# Patient Record
Sex: FEMALE | Race: WHITE | NOT HISPANIC OR LATINO | Employment: FULL TIME | URBAN - METROPOLITAN AREA
[De-identification: names, ages, dates, MRNs, and addresses within clinical notes are randomized per-mention and may not be internally consistent; named-entity substitution may affect disease eponyms.]

---

## 2017-01-06 ENCOUNTER — COMMUNICATION - HEALTHEAST (OUTPATIENT)
Dept: FAMILY MEDICINE | Facility: CLINIC | Age: 32
End: 2017-01-06

## 2017-02-21 ENCOUNTER — OFFICE VISIT - HEALTHEAST (OUTPATIENT)
Dept: FAMILY MEDICINE | Facility: CLINIC | Age: 32
End: 2017-02-21

## 2017-02-21 DIAGNOSIS — R73.01 IMPAIRED FASTING GLUCOSE: ICD-10-CM

## 2017-02-21 DIAGNOSIS — Z00.00 ROUTINE GENERAL MEDICAL EXAMINATION AT A HEALTH CARE FACILITY: ICD-10-CM

## 2017-02-21 DIAGNOSIS — Z71.84 COUNSELING ABOUT TRAVEL: ICD-10-CM

## 2017-02-21 LAB
CHOLEST SERPL-MCNC: 122 MG/DL
FASTING STATUS PATIENT QL REPORTED: YES
HDLC SERPL-MCNC: 61 MG/DL
LDLC SERPL CALC-MCNC: 51 MG/DL
TRIGL SERPL-MCNC: 48 MG/DL

## 2017-02-21 ASSESSMENT — MIFFLIN-ST. JEOR: SCORE: 1229.29

## 2017-03-07 ENCOUNTER — COMMUNICATION - HEALTHEAST (OUTPATIENT)
Dept: FAMILY MEDICINE | Facility: CLINIC | Age: 32
End: 2017-03-07

## 2017-03-08 ENCOUNTER — AMBULATORY - HEALTHEAST (OUTPATIENT)
Dept: FAMILY MEDICINE | Facility: CLINIC | Age: 32
End: 2017-03-08

## 2017-03-09 ENCOUNTER — AMBULATORY - HEALTHEAST (OUTPATIENT)
Dept: FAMILY MEDICINE | Facility: CLINIC | Age: 32
End: 2017-03-09

## 2017-03-09 DIAGNOSIS — F41.9 ANXIETY: ICD-10-CM

## 2017-09-05 ENCOUNTER — RECORDS - HEALTHEAST (OUTPATIENT)
Dept: ADMINISTRATIVE | Facility: OTHER | Age: 32
End: 2017-09-05

## 2017-11-17 ENCOUNTER — RECORDS - HEALTHEAST (OUTPATIENT)
Dept: ADMINISTRATIVE | Facility: OTHER | Age: 32
End: 2017-11-17

## 2018-02-01 ENCOUNTER — OFFICE VISIT - HEALTHEAST (OUTPATIENT)
Dept: FAMILY MEDICINE | Facility: CLINIC | Age: 33
End: 2018-02-01

## 2018-02-01 DIAGNOSIS — M25.552 LEFT HIP PAIN: ICD-10-CM

## 2018-02-01 DIAGNOSIS — Z00.00 ROUTINE GENERAL MEDICAL EXAMINATION AT A HEALTH CARE FACILITY: ICD-10-CM

## 2018-02-01 ASSESSMENT — MIFFLIN-ST. JEOR: SCORE: 1236.54

## 2018-06-13 ENCOUNTER — RECORDS - HEALTHEAST (OUTPATIENT)
Dept: ADMINISTRATIVE | Facility: OTHER | Age: 33
End: 2018-06-13

## 2018-11-26 ENCOUNTER — COMMUNICATION - HEALTHEAST (OUTPATIENT)
Dept: FAMILY MEDICINE | Facility: CLINIC | Age: 33
End: 2018-11-26

## 2018-12-07 ENCOUNTER — OFFICE VISIT - HEALTHEAST (OUTPATIENT)
Dept: FAMILY MEDICINE | Facility: CLINIC | Age: 33
End: 2018-12-07

## 2018-12-07 DIAGNOSIS — R82.90 BAD ODOR OF URINE: ICD-10-CM

## 2018-12-07 DIAGNOSIS — N89.8 VAGINAL DISCHARGE: ICD-10-CM

## 2018-12-07 DIAGNOSIS — R31.29 MICROSCOPIC HEMATURIA: ICD-10-CM

## 2018-12-07 LAB
ALBUMIN UR-MCNC: NEGATIVE MG/DL
APPEARANCE UR: CLEAR
BACTERIA #/AREA URNS HPF: ABNORMAL HPF
BILIRUB UR QL STRIP: NEGATIVE
CLUE CELLS: NORMAL
COLOR UR AUTO: YELLOW
GLUCOSE UR STRIP-MCNC: NEGATIVE MG/DL
HGB UR QL STRIP: ABNORMAL
KETONES UR STRIP-MCNC: NEGATIVE MG/DL
LEUKOCYTE ESTERASE UR QL STRIP: NEGATIVE
NITRATE UR QL: NEGATIVE
PH UR STRIP: 6.5 [PH] (ref 5–8)
RBC #/AREA URNS AUTO: ABNORMAL HPF
SP GR UR STRIP: <=1.005 (ref 1–1.03)
SQUAMOUS #/AREA URNS AUTO: ABNORMAL LPF
TRICHOMONAS, WET PREP: NORMAL
UROBILINOGEN UR STRIP-ACNC: ABNORMAL
WBC #/AREA URNS AUTO: ABNORMAL HPF
YEAST, WET PREP: NORMAL

## 2018-12-07 ASSESSMENT — MIFFLIN-ST. JEOR: SCORE: 1228.38

## 2018-12-08 LAB — BACTERIA SPEC CULT: NO GROWTH

## 2018-12-12 ENCOUNTER — COMMUNICATION - HEALTHEAST (OUTPATIENT)
Dept: FAMILY MEDICINE | Facility: CLINIC | Age: 33
End: 2018-12-12

## 2018-12-12 ENCOUNTER — AMBULATORY - HEALTHEAST (OUTPATIENT)
Dept: FAMILY MEDICINE | Facility: CLINIC | Age: 33
End: 2018-12-12

## 2018-12-12 DIAGNOSIS — R31.29 MICROSCOPIC HEMATURIA: ICD-10-CM

## 2018-12-21 ENCOUNTER — COMMUNICATION - HEALTHEAST (OUTPATIENT)
Dept: FAMILY MEDICINE | Facility: CLINIC | Age: 33
End: 2018-12-21

## 2018-12-21 ENCOUNTER — AMBULATORY - HEALTHEAST (OUTPATIENT)
Dept: LAB | Facility: CLINIC | Age: 33
End: 2018-12-21

## 2018-12-21 DIAGNOSIS — R31.29 MICROSCOPIC HEMATURIA: ICD-10-CM

## 2018-12-21 LAB
ALBUMIN UR-MCNC: NEGATIVE MG/DL
APPEARANCE UR: CLEAR
BACTERIA #/AREA URNS HPF: ABNORMAL HPF
BILIRUB UR QL STRIP: NEGATIVE
COLOR UR AUTO: YELLOW
GLUCOSE UR STRIP-MCNC: NEGATIVE MG/DL
HGB UR QL STRIP: ABNORMAL
KETONES UR STRIP-MCNC: NEGATIVE MG/DL
LEUKOCYTE ESTERASE UR QL STRIP: NEGATIVE
NITRATE UR QL: NEGATIVE
PH UR STRIP: 5.5 [PH] (ref 5–8)
RBC #/AREA URNS AUTO: ABNORMAL HPF
SP GR UR STRIP: <=1.005 (ref 1–1.03)
SQUAMOUS #/AREA URNS AUTO: ABNORMAL LPF
UROBILINOGEN UR STRIP-ACNC: ABNORMAL
WBC #/AREA URNS AUTO: ABNORMAL HPF

## 2018-12-22 ENCOUNTER — OFFICE VISIT (OUTPATIENT)
Dept: URGENT CARE | Facility: URGENT CARE | Age: 33
End: 2018-12-22
Payer: COMMERCIAL

## 2018-12-22 VITALS
HEIGHT: 64 IN | OXYGEN SATURATION: 98 % | DIASTOLIC BLOOD PRESSURE: 66 MMHG | WEIGHT: 119 LBS | BODY MASS INDEX: 20.32 KG/M2 | HEART RATE: 72 BPM | SYSTOLIC BLOOD PRESSURE: 94 MMHG | TEMPERATURE: 99.1 F

## 2018-12-22 DIAGNOSIS — M54.42 CHRONIC LEFT-SIDED LOW BACK PAIN WITH LEFT-SIDED SCIATICA: Primary | ICD-10-CM

## 2018-12-22 DIAGNOSIS — G89.29 CHRONIC LEFT-SIDED LOW BACK PAIN WITH LEFT-SIDED SCIATICA: Primary | ICD-10-CM

## 2018-12-22 PROCEDURE — 99203 OFFICE O/P NEW LOW 30 MIN: CPT | Performed by: FAMILY MEDICINE

## 2018-12-22 RX ORDER — IBUPROFEN 400 MG/1
400 TABLET, FILM COATED ORAL EVERY 6 HOURS PRN
COMMUNITY

## 2018-12-22 RX ORDER — SPIRONOLACTONE 100 MG/1
100 TABLET, FILM COATED ORAL DAILY
COMMUNITY

## 2018-12-22 ASSESSMENT — MIFFLIN-ST. JEOR: SCORE: 1224.78

## 2018-12-22 NOTE — PROGRESS NOTES
Subjective: Patient has complained about left-sided pain in the hip and low back radiating into the abdomen and down sometimes the left leg for a few months anyway, initially got some help with a chiropractor but now it is not helping.  She sits all day at work.  Exercise does not seem to make things worse.  Bowel and bladder are working okay.  She has about 2 menses a month and so recently has had a couple of urinary evaluations done including one yesterday which only show a little blood consistent with menstrual blood.  Apparently there is a family history of some kidney problems.    Objective: Abdominal exam is normal.  Hip exam is normal.  Low back exam is normal.    Assessment and plan: Left low back symptoms with some nerve irritation I believe.  I suggested may be a physical therapy trial approach might be worth doing at this point and if that fails, go back to her regular doctor to sort out how to approach this problem.

## 2019-03-05 ENCOUNTER — OFFICE VISIT - HEALTHEAST (OUTPATIENT)
Dept: FAMILY MEDICINE | Facility: CLINIC | Age: 34
End: 2019-03-05

## 2019-03-05 DIAGNOSIS — Z13.220 LIPID SCREENING: ICD-10-CM

## 2019-03-05 DIAGNOSIS — Z00.00 ROUTINE GENERAL MEDICAL EXAMINATION AT A HEALTH CARE FACILITY: ICD-10-CM

## 2019-03-05 DIAGNOSIS — M25.552 HIP PAIN, LEFT: ICD-10-CM

## 2019-03-05 DIAGNOSIS — Z13.1 DIABETES MELLITUS SCREENING: ICD-10-CM

## 2019-03-05 DIAGNOSIS — L70.0 ACNE VULGARIS: ICD-10-CM

## 2019-03-05 LAB
CHOLEST SERPL-MCNC: 152 MG/DL
FASTING STATUS PATIENT QL REPORTED: YES
FASTING STATUS PATIENT QL REPORTED: YES
GLUCOSE BLD-MCNC: 88 MG/DL (ref 70–125)
HDLC SERPL-MCNC: 58 MG/DL
HGB BLD-MCNC: 13.6 G/DL (ref 12–16)
LDLC SERPL CALC-MCNC: 78 MG/DL
TRIGL SERPL-MCNC: 82 MG/DL

## 2019-03-05 ASSESSMENT — MIFFLIN-ST. JEOR: SCORE: 1215.79

## 2019-03-13 ENCOUNTER — RECORDS - HEALTHEAST (OUTPATIENT)
Dept: ADMINISTRATIVE | Facility: OTHER | Age: 34
End: 2019-03-13

## 2019-05-24 ENCOUNTER — OFFICE VISIT - HEALTHEAST (OUTPATIENT)
Dept: FAMILY MEDICINE | Facility: CLINIC | Age: 34
End: 2019-05-24

## 2019-05-24 DIAGNOSIS — L70.0 ACNE VULGARIS: ICD-10-CM

## 2019-05-24 DIAGNOSIS — M25.552 HIP PAIN, LEFT: ICD-10-CM

## 2019-05-24 DIAGNOSIS — Z01.818 ENCOUNTER FOR PREOPERATIVE EXAMINATION FOR GENERAL SURGICAL PROCEDURE: ICD-10-CM

## 2019-05-24 LAB
HCG UR QL: NEGATIVE
HGB BLD-MCNC: 13.2 G/DL (ref 12–16)

## 2019-05-24 ASSESSMENT — MIFFLIN-ST. JEOR: SCORE: 1214.55

## 2019-06-18 ENCOUNTER — RECORDS - HEALTHEAST (OUTPATIENT)
Dept: ADMINISTRATIVE | Facility: OTHER | Age: 34
End: 2019-06-18

## 2019-07-03 ENCOUNTER — RECORDS - HEALTHEAST (OUTPATIENT)
Dept: ADMINISTRATIVE | Facility: OTHER | Age: 34
End: 2019-07-03

## 2019-08-05 ENCOUNTER — RECORDS - HEALTHEAST (OUTPATIENT)
Dept: ADMINISTRATIVE | Facility: OTHER | Age: 34
End: 2019-08-05

## 2020-03-06 ENCOUNTER — OFFICE VISIT - HEALTHEAST (OUTPATIENT)
Dept: FAMILY MEDICINE | Facility: CLINIC | Age: 35
End: 2020-03-06

## 2020-03-06 DIAGNOSIS — H92.02 OTALGIA, LEFT: ICD-10-CM

## 2020-03-06 DIAGNOSIS — Z13.220 LIPID SCREENING: ICD-10-CM

## 2020-03-06 DIAGNOSIS — N89.8 VAGINAL DISCHARGE: ICD-10-CM

## 2020-03-06 DIAGNOSIS — L84 CALLUS OF FOOT: ICD-10-CM

## 2020-03-06 DIAGNOSIS — Z79.899 MEDICATION MANAGEMENT: ICD-10-CM

## 2020-03-06 DIAGNOSIS — L70.0 ACNE VULGARIS: ICD-10-CM

## 2020-03-06 DIAGNOSIS — M79.661 RIGHT CALF PAIN: ICD-10-CM

## 2020-03-06 DIAGNOSIS — Z00.00 ROUTINE GENERAL MEDICAL EXAMINATION AT A HEALTH CARE FACILITY: ICD-10-CM

## 2020-03-06 LAB
ANION GAP SERPL CALCULATED.3IONS-SCNC: 9 MMOL/L (ref 5–18)
BUN SERPL-MCNC: 11 MG/DL (ref 8–22)
CALCIUM SERPL-MCNC: 9.4 MG/DL (ref 8.5–10.5)
CHLORIDE BLD-SCNC: 102 MMOL/L (ref 98–107)
CHOLEST SERPL-MCNC: 155 MG/DL
CLUE CELLS: NORMAL
CO2 SERPL-SCNC: 27 MMOL/L (ref 22–31)
CREAT SERPL-MCNC: 0.79 MG/DL (ref 0.6–1.1)
D DIMER PPP FEU-MCNC: 0.3 FEU UG/ML
FASTING STATUS PATIENT QL REPORTED: YES
GFR SERPL CREATININE-BSD FRML MDRD: >60 ML/MIN/1.73M2
GLUCOSE BLD-MCNC: 81 MG/DL (ref 70–125)
HDLC SERPL-MCNC: 62 MG/DL
HGB BLD-MCNC: 13.7 G/DL (ref 12–16)
LDLC SERPL CALC-MCNC: 82 MG/DL
MAGNESIUM SERPL-MCNC: 1.6 MG/DL (ref 1.8–2.6)
POTASSIUM BLD-SCNC: 3.7 MMOL/L (ref 3.5–5)
SODIUM SERPL-SCNC: 138 MMOL/L (ref 136–145)
TRICHOMONAS, WET PREP: NORMAL
TRIGL SERPL-MCNC: 57 MG/DL
YEAST, WET PREP: NORMAL

## 2020-03-06 ASSESSMENT — MIFFLIN-ST. JEOR: SCORE: 1222.94

## 2020-03-08 ENCOUNTER — AMBULATORY - HEALTHEAST (OUTPATIENT)
Dept: FAMILY MEDICINE | Facility: CLINIC | Age: 35
End: 2020-03-08

## 2020-03-08 DIAGNOSIS — E83.42 HYPOMAGNESEMIA: ICD-10-CM

## 2020-03-09 LAB
HPV SOURCE: NORMAL
HUMAN PAPILLOMA VIRUS 16 DNA: NEGATIVE
HUMAN PAPILLOMA VIRUS 18 DNA: NEGATIVE
HUMAN PAPILLOMA VIRUS FINAL DIAGNOSIS: NORMAL
HUMAN PAPILLOMA VIRUS OTHER HR: NEGATIVE
SPECIMEN DESCRIPTION: NORMAL

## 2020-04-06 ENCOUNTER — COMMUNICATION - HEALTHEAST (OUTPATIENT)
Dept: FAMILY MEDICINE | Facility: CLINIC | Age: 35
End: 2020-04-06

## 2020-04-06 DIAGNOSIS — E83.42 HYPOMAGNESEMIA: ICD-10-CM

## 2020-04-10 ENCOUNTER — COMMUNICATION - HEALTHEAST (OUTPATIENT)
Dept: FAMILY MEDICINE | Facility: CLINIC | Age: 35
End: 2020-04-10

## 2020-04-10 ENCOUNTER — AMBULATORY - HEALTHEAST (OUTPATIENT)
Dept: LAB | Facility: CLINIC | Age: 35
End: 2020-04-10

## 2020-04-10 DIAGNOSIS — E83.42 HYPOMAGNESEMIA: ICD-10-CM

## 2020-04-10 LAB — MAGNESIUM SERPL-MCNC: 1.7 MG/DL (ref 1.8–2.6)

## 2020-05-19 ENCOUNTER — AMBULATORY - HEALTHEAST (OUTPATIENT)
Dept: LAB | Facility: CLINIC | Age: 35
End: 2020-05-19

## 2020-05-19 ENCOUNTER — COMMUNICATION - HEALTHEAST (OUTPATIENT)
Dept: LAB | Facility: CLINIC | Age: 35
End: 2020-05-19

## 2020-05-19 ENCOUNTER — AMBULATORY - HEALTHEAST (OUTPATIENT)
Dept: FAMILY MEDICINE | Facility: CLINIC | Age: 35
End: 2020-05-19

## 2020-05-19 DIAGNOSIS — E83.42 HYPOMAGNESEMIA: ICD-10-CM

## 2020-05-19 LAB — MAGNESIUM SERPL-MCNC: 1.8 MG/DL (ref 1.8–2.6)

## 2020-07-23 ENCOUNTER — COMMUNICATION - HEALTHEAST (OUTPATIENT)
Dept: FAMILY MEDICINE | Facility: CLINIC | Age: 35
End: 2020-07-23

## 2020-07-23 DIAGNOSIS — E83.42 HYPOMAGNESEMIA: ICD-10-CM

## 2021-05-07 ENCOUNTER — OFFICE VISIT - HEALTHEAST (OUTPATIENT)
Dept: FAMILY MEDICINE | Facility: CLINIC | Age: 36
End: 2021-05-07

## 2021-05-07 DIAGNOSIS — Z00.00 ROUTINE GENERAL MEDICAL EXAMINATION AT A HEALTH CARE FACILITY: ICD-10-CM

## 2021-05-07 DIAGNOSIS — Z13.220 LIPID SCREENING: ICD-10-CM

## 2021-05-07 DIAGNOSIS — B35.1 ONYCHOMYCOSIS: ICD-10-CM

## 2021-05-07 DIAGNOSIS — L65.9 HAIR LOSS: ICD-10-CM

## 2021-05-07 DIAGNOSIS — Z13.1 DIABETES MELLITUS SCREENING: ICD-10-CM

## 2021-05-07 DIAGNOSIS — L84 CALLUS OF FOOT: ICD-10-CM

## 2021-05-07 DIAGNOSIS — M77.01 MEDIAL EPICONDYLITIS OF ELBOW, RIGHT: ICD-10-CM

## 2021-05-07 LAB
CHOLEST SERPL-MCNC: 152 MG/DL
FASTING STATUS PATIENT QL REPORTED: YES
FASTING STATUS PATIENT QL REPORTED: YES
GLUCOSE BLD-MCNC: 80 MG/DL (ref 70–125)
HBA1C MFR BLD: 4.9 %
HDLC SERPL-MCNC: 62 MG/DL
HGB BLD-MCNC: 13.1 G/DL (ref 12–16)
LDLC SERPL CALC-MCNC: 75 MG/DL
T4 FREE SERPL-MCNC: 1 NG/DL (ref 0.7–1.8)
TRIGL SERPL-MCNC: 75 MG/DL
TSH SERPL DL<=0.005 MIU/L-ACNC: 1.46 UIU/ML (ref 0.3–5)

## 2021-05-07 ASSESSMENT — MIFFLIN-ST. JEOR: SCORE: 1213.87

## 2021-05-27 VITALS
HEART RATE: 60 BPM | SYSTOLIC BLOOD PRESSURE: 100 MMHG | HEIGHT: 65 IN | DIASTOLIC BLOOD PRESSURE: 62 MMHG | BODY MASS INDEX: 19.39 KG/M2 | WEIGHT: 116.4 LBS

## 2021-05-29 NOTE — PROGRESS NOTES
Preoperative Exam    Scheduled Procedure: left hip arthroscopy  Surgery Date:  6/18/19  Surgery Location: [facility], fax [phone]    Surgeon:  Dr. Mcfarland    Assessment/Plan:     1. Encounter for preoperative examination for general surgical procedure  Patient is approved for surgery.  She will hold NSAIDs and fish oil for 7 days prior to surgery.  - Hemoglobin  - Pregnancy, Urine    2. Hip pain, left  Discussed symptomatic treatment with over-the-counter Tylenol.    3. Acne vulgaris  Patient will hold Spironolactone the morning of surgery.  May resume postop per surgeon.        Surgical Procedure Risk: Intermediate (reported cardiac risk generally 1-5%)  Have you had prior anesthesia?: Yes  Have you or any family members had a previous anesthesia reaction:  No  Do you or any family members have a history of a clotting or bleeding disorder?: No  Cardiac Risk Assessment: no increased risk for major cardiac complications    Patient approved for surgery with general or local anesthesia.    Functional Status: Independent  Patient plans to recover at home with family.     Subjective:      Arianna Leung is a 34 y.o. female who presents for a preoperative consultation.  Patient has been experiencing left hip pain for 2 years.  She denies any known injury.  Pain is an intermittent ache which is exacerbated with working out and walking or standing for prolonged periods.  She feels as though nothing assist the pain.  She has tried ibuprofen and Tylenol.  MRI showed a labral tear.    Patient takes spironolactone daily for acne.    All other systems reviewed and are negative, other than those listed in the HPI.    Pertinent History  Do you have difficulty breathing or chest pain after walking up a flight of stairs: No  History of obstructive sleep apnea: No  Steroid use in the last 6 months: No  Frequent Aspirin/NSAID use: No  Prior Blood Transfusion: No  Prior Blood Transfusion  Reaction: No  If for some reason prior to, during or after the procedure, if it is medically indicated, would you be willing to have a blood transfusion?:  There is no transfusion refusal.    Current Outpatient Medications   Medication Sig Dispense Refill     MULTIVITAMIN (MULTIPLE VITAMIN ORAL) Take 1 tablet by mouth daily.       omega-3/dha/epa/fish oil (FISH OIL-OMEGA-3 FATTY ACIDS) 300-1,000 mg capsule        spironolactone (ALDACTONE) 50 MG tablet Take 50 mg by mouth 3 (three) times a day as needed.       No current facility-administered medications for this visit.         No Known Allergies    Patient Active Problem List   Diagnosis     Fibroadenoma Of The Breast     Impaired Fasting Glucose       No past medical history on file.    Past Surgical History:   Procedure Laterality Date     BREAST BIOPSY Left 2005       Social History     Socioeconomic History     Marital status: Single     Spouse name: Not on file     Number of children: Not on file     Years of education: Not on file     Highest education level: Not on file   Occupational History     Not on file   Social Needs     Financial resource strain: Not on file     Food insecurity:     Worry: Not on file     Inability: Not on file     Transportation needs:     Medical: Not on file     Non-medical: Not on file   Tobacco Use     Smoking status: Never Smoker     Smokeless tobacco: Never Used   Substance and Sexual Activity     Alcohol use: Yes     Comment: rare     Drug use: No     Sexual activity: Never     Comment: single    Lifestyle     Physical activity:     Days per week: Not on file     Minutes per session: Not on file     Stress: Not on file   Relationships     Social connections:     Talks on phone: Not on file     Gets together: Not on file     Attends [employer] service: Not on file     Active member of club or organization: Not on file     Attends meetings of clubs or organizations: Not on file     Relationship status: Not on file     Intimate  "partner violence:     Fear of current or ex partner: Not on file     Emotionally abused: Not on file     Physically abused: Not on file     Forced sexual activity: Not on file   Other Topics Concern     Not on file   Social History Narrative     Not on file       Patient Care Team:  Eleni Quijano CNP as PCP - General          Objective:     Vitals:    05/24/19 1429   BP: 102/60   Pulse: 66   Weight: 118 lb 4.8 oz (53.7 kg)   Height: 5' 4.5\" (1.638 m)         Physical Exam:  /60 (Patient Site: Left Arm, Patient Position: Sitting, Cuff Size: Adult Regular)   Pulse 66   Ht 5' 4.5\" (1.638 m)   Wt 118 lb 4.8 oz (53.7 kg)   BMI 19.99 kg/m      General Appearance:    Alert, cooperative, no distress, appears stated age   Head:    Normocephalic, without obvious abnormality, atraumatic   Eyes:    PERRL, conjunctiva/corneas clear, EOM's intact, fundi     benign, both eyes   Ears:    Normal TM's and external ear canals, both ears   Nose:   Nares normal, septum midline, mucosa normal, no drainage     or sinus tenderness   Throat:   Lips, mucosa, and tongue normal; teeth and gums normal   Neck:   Supple, symmetrical, trachea midline, no adenopathy;     thyroid:  no enlargement/tenderness/nodules; no carotid    bruit or JVD   Back:     Symmetric, no curvature, ROM normal, no CVA tenderness   Lungs:     Clear to auscultation bilaterally, respirations unlabored   Chest Wall:    No tenderness or deformity    Heart:    Regular rate and rhythm, S1 and S2 normal, no murmur, rub    or gallop   Breast Exam:    deferred   Abdomen:     Soft, non-tender, bowel sounds active all four quadrants,     no masses, no organomegaly   Genitalia:    deferred   Rectal:    deferred   Extremities:   Extremities normal, atraumatic, no cyanosis or edema   Pulses:   2+ and symmetric all extremities   Skin:   Skin color, texture, turgor normal, no rashes or lesions   Lymph nodes:   Cervical, supraclavicular, and axillary nodes normal "   Neurologic:   CNII-XII intact, normal strength, sensation and reflexes     throughout       Patient Instructions     Hold all supplements, aspirin and NSAIDs for 7 days prior to surgery.  Follow your surgeon's direction on when to stop eating and drinking prior to surgery.  Your surgeon will be managing your pain after your surgery.    Remove all jewelry and metal piercings before your surgery.   Remove nail polish from fingers before surgery.      Labs:  Recent Results (from the past 24 hour(s))   Hemoglobin    Collection Time: 05/24/19  2:34 PM   Result Value Ref Range    Hemoglobin 13.2 12.0 - 16.0 g/dL   Pregnancy, Urine    Collection Time: 05/24/19  3:04 PM   Result Value Ref Range    Pregnancy Test, Urine Negative Negative       Immunization History   Administered Date(s) Administered     Dtap 1984, 1984, 1984, 09/26/1985, 05/26/1989     Hep A, Adult IM (19yr & older) 01/25/2013, 02/21/2017     Hep A, historic 01/25/2013     Hep B, Adult 07/09/1999, 08/19/1999, 07/24/2000     Influenza, seasonal,quad inj 36+ mos 02/01/2018     MMR 09/26/1985, 06/25/1996     Td, adult adsorbed, PF 06/25/1996, 06/05/2006     Td,adult,historic,unspecified 06/05/2006     Tdap 01/22/2013     Typhoid Live, Oral 01/28/2013           Electronically signed by Eleni Quijano CNP 05/24/19 2:31 PM

## 2021-05-30 VITALS — BODY MASS INDEX: 21.05 KG/M2 | WEIGHT: 123.3 LBS | HEIGHT: 64 IN

## 2021-06-01 VITALS — WEIGHT: 124.9 LBS | BODY MASS INDEX: 21.32 KG/M2 | HEIGHT: 64 IN

## 2021-06-02 VITALS — HEIGHT: 65 IN | BODY MASS INDEX: 19.61 KG/M2 | WEIGHT: 117.7 LBS

## 2021-06-02 VITALS — WEIGHT: 123.1 LBS | HEIGHT: 64 IN | BODY MASS INDEX: 21.02 KG/M2

## 2021-06-03 VITALS — HEIGHT: 65 IN | BODY MASS INDEX: 19.71 KG/M2 | WEIGHT: 118.3 LBS

## 2021-06-04 VITALS
DIASTOLIC BLOOD PRESSURE: 60 MMHG | BODY MASS INDEX: 19.73 KG/M2 | OXYGEN SATURATION: 99 % | HEIGHT: 65 IN | WEIGHT: 118.4 LBS | HEART RATE: 58 BPM | SYSTOLIC BLOOD PRESSURE: 96 MMHG

## 2021-06-06 NOTE — PROGRESS NOTES
Assessment and Plan:    1. Routine general medical examination at a health care facility  Discussed consuming a healthy diet and exercising.  She declines HIV screening and influenza vaccine.  She is otherwise up-to-date.  - Gynecologic Cytology (PAP Smear)  - Hemoglobin    2. Lipid screening  - Lipid Cascade    3. Right calf pain  Differentials for calf pain include DVT, electrolyte imbalance, muscular strain.  Will obtain d-dimer to rule out DVT.  Will check BMP and magnesium.  If d-dimer is elevated, will obtain ultrasound.  Discussed symptomatic treatment.  - D-dimer, Quantitative  - Magnesium    4. Otalgia, left  Provided reassurance.  No infection is noted today.    5. Acne vulgaris  She continues to see dermatology.  She is taking spironolactone.    6. Callus of foot  Discussed symptomatic treatment with the pedicure or using a pumice.  If no improvement in symptoms, may consider referral to podiatry.  She is content with the plan.    7. Medication management  - Basic Metabolic Panel    8.  Vaginal discharge  Wet prep ordered.  Will notify patient of the results.  She is content with the plan.     Subjective:     Arianna is a 34 y.o. female presenting to the clinic for a female physical.       LMP: 2/14/20 regular once/month   Hx of abnormal pap smear: none  Last pap smear: 1/13/15 normal, negative HPV   Perform self-breast exams: none  Vaginal discharge or irritation: normal discharge  Sexually active: no, currently single  Contraception: none  Concerns for STDs: no  Previous pregnancies:no    Patient sees dermatology who prescribes spironolactone for acne.    Patient has multiple concerns today.  She is concerned of a possible plantars wart on the bottom of her right foot.  This developed this summer.  She occasionally experiences pain with ambulation.  She has not tried any over-the-counter products for her symptoms.  Secondly, patient is concerned of right calf pain.  A piece of plywood fell on her calf  in October.  This caused a scab develop and she had some tenderness within the area.  Patient states the calf still feels bruised and tender to touch.  She has not noticed any erythema or swelling.  She traveled in October.  She denies any personal family history of blood clots.  Lastly, patient occasionally has a sharp pain within her left ear.  This lasts for a few seconds.  She feels as though fluid is moving in her ear.  She has had some ear fullness.  She denies recent cold symptoms or fever.    She has noticed an increase in vaginal discharge over the past month.  She denies any vaginal itching or odor.  She has not tried any over-the-counter products for her symptoms.  She has no concerns for STDs.    Review of systems:  I performed a 10 point review of systems.  All pertinent positives and negatives are noted in the HPI. All others are negative.     No Known Allergies    Current Outpatient Medications on File Prior to Visit   Medication Sig Dispense Refill     cholecalciferol, vitamin D3, 1,000 unit (25 mcg) tablet Take 1,000 Units by mouth daily.       MULTIVITAMIN (MULTIPLE VITAMIN ORAL) Take 1 tablet by mouth daily.       omega-3/dha/epa/fish oil (FISH OIL-OMEGA-3 FATTY ACIDS) 300-1,000 mg capsule        spironolactone (ALDACTONE) 50 MG tablet Take 50 mg by mouth 3 (three) times a day as needed.       No current facility-administered medications on file prior to visit.        Social History     Socioeconomic History     Marital status: Single     Spouse name: Not on file     Number of children: Not on file     Years of education: Not on file     Highest education level: Not on file   Occupational History     Not on file   Social Needs     Financial resource strain: Not on file     Food insecurity:     Worry: Not on file     Inability: Not on file     Transportation needs:     Medical: Not on file     Non-medical: Not on file   Tobacco Use     Smoking status: Never Smoker     Smokeless tobacco: Never Used    Substance and Sexual Activity     Alcohol use: Yes     Comment: rare     Drug use: No     Sexual activity: Never     Comment: single    Lifestyle     Physical activity:     Days per week: Not on file     Minutes per session: Not on file     Stress: Not on file   Relationships     Social connections:     Talks on phone: Not on file     Gets together: Not on file     Attends [employer] service: Not on file     Active member of club or organization: Not on file     Attends meetings of clubs or organizations: Not on file     Relationship status: Not on file     Intimate partner violence:     Fear of current or ex partner: Not on file     Emotionally abused: Not on file     Physically abused: Not on file     Forced sexual activity: Not on file   Other Topics Concern     Not on file   Social History Narrative     Not on file       No past medical history on file.    Family History   Problem Relation Age of Onset     Breast cancer Paternal Grandmother 40     Kidney disease Father      Hypertension Father      No Medical Problems Mother      No Medical Problems Sister      No Medical Problems Brother      No Medical Problems Sister      No Medical Problems Brother        Past Surgical History:   Procedure Laterality Date     BREAST BIOPSY Left 2005       Objective:     Vitals:    03/06/20 0924   BP: 96/60   Pulse: (!) 58   SpO2: 99%       Patient is alert, no obvious distress.   Skin: Warm, dry.  No rashes or lesions. Skin turgor rapid return.   HEENT:  Eyes normal.  Ears normal.  Nose patent, mucosa pink.  Oropharynx mucosa pink, no lesions or tonsil enlargement.   Neck:  Supple, without lymphadenopathy, bruits, JVD. Thyroid normal texture and size.    Lungs:  Clear to auscultation.  No wheezing, rales noted.  Respirations even and unlabored.   Heart:  Regular rate and rhythm.  No murmurs.   Breasts:  Normal.  No surrounding adenopathy.   Abdomen: Soft, nontender.  No organomegaly.  Bowel sounds normoactive.  No guarding  or masses noted.   :  External genitalia normal.  Normal vaginal mucosa. Large amount of vaginal discharge is present  Cervix no lesions or cervical motion tenderness.   Musculoskeletal:  Full ROM of extremities.  Muscle strength equal +5/5. She has a small callus present beneath her 5th toe.  She is tender to palpation of her lower right calf.  There are no areas of erythema, ecchymosis, signs of trauma.  Neurological:  Cranial nerves 2-12 intact.

## 2021-06-07 NOTE — TELEPHONE ENCOUNTER
Who is calling:  Arianna   Reason for Call: Arianna was told to come back in 3 to 4 weeks for a magnesium labs. No order in system currently. Please add order for patient so she can schedule lab appointment.   Date of last appointment with primary care: n/a  Okay to leave a detailed message: Yes

## 2021-06-09 NOTE — TELEPHONE ENCOUNTER
RN cannot approve Refill Request    RN can NOT refill this medication med is not covered by policy/route to provider. Last office visit: 12/7/2018 Eleni Quijano CNP Last Physical: 3/6/2020 Last MTM visit: Visit date not found Last visit same specialty: 12/7/2018 Eleni Quijano CNP.  Next visit within 3 mo: Visit date not found  Next physical within 3 mo: Visit date not found      Maura Myers, [facility] Triage/Med Refill 7/25/2020    Requested Prescriptions   Pending Prescriptions Disp Refills     magnesium oxide (MAG-OX) 400 mg (241.3 mg magnesium) tablet [Pharmacy Med Name: MAGNESIUM OXIDE 400MG TABLETS] 30 tablet 2     Sig: TAKE 1 TABLET BY MOUTH EVERY DAY       There is no refill protocol information for this order

## 2021-06-09 NOTE — PROGRESS NOTES
Subjective:     Arianna is a 31 y.o. female presenting to the clinic for a female physical.     LMP: two weeks ago, regular once/month   Hx of abnormal pap smear: none  Last pap smear: 1/13/15 normal, negative HPV   Perform self-breast exams: none  Vaginal discharge or irritation: normal discharge  Sexually active: no, currently single  Contraception: none  Concerns for STDs: no  Previous pregnancies:no    Patient was diagnosed with right breast microcalcifications last year. She had a mammogram-guided needle/wire biopsy which was benign.      Patient also presents for travel consult.  She is traveling to [facility] on 3/19/17 through 3/31/17.  She is traveling on vacation with friends.  She is feeling well today.  She denies cold symptoms including rhinorrhea, headache, stomachache, nausea, vomiting, fever.  She traveled to [facility] in 2013 in which she receive the typhoid vaccine.  She had a positive hepatitis B vaccine titer.    Review of systems:  I performed a 10 point review of systems.  All pertinent positives and negatives are noted in the HPI. All others are negative.     No Known Allergies    Current Outpatient Prescriptions on File Prior to Visit   Medication Sig Dispense Refill     MULTIVITAMIN (MULTIPLE VITAMIN ORAL) Take 1 tablet by mouth daily.       No current facility-administered medications on file prior to visit.        Social History     Social History     Marital status: Single     Spouse name: N/A     Number of children: N/A     Years of education: N/A     Occupational History     Not on file.     Social History Main Topics     Smoking status: Never Smoker     Smokeless tobacco: Never Used     Alcohol use No     Drug use: No     Sexual activity: No      Comment: single      Other Topics Concern     Not on file     Social History Narrative       No past medical history on file.    Family History   Problem Relation Age of Onset     Breast cancer Paternal Grandmother 40     Kidney disease Father       Hypertension Father      No Medical Problems Mother      No Medical Problems Sister      No Medical Problems Brother      No Medical Problems Sister      No Medical Problems Brother        Past Surgical History:   Procedure Laterality Date     BREAST BIOPSY Left 2005       Objective:     Vitals:    02/21/17 0731   BP: 122/62   Pulse: (!) 57   SpO2: 99%       Patient is alert, no obvious distress.   Skin: Warm, dry.  No rashes or lesions. Skin turgor rapid return.   HEENT:  Eyes normal.  Ears normal.  Nose patent, mucosa pink.  Oropharynx mucosa pink, no lesions or tonsil enlargement.   Neck:  Supple, without lymphadenopathy, bruits, JVD. Thyroid normal texture and size.    Lungs:  Clear to auscultation.  No wheezing, rales noted.  Respirations even and unlabored.   Heart:  Regular rate and rhythm.  No murmurs.   Breasts:  Normal.  No surrounding adenopathy.   Abdomen: Soft, nontender.  No organomegaly.  Bowel sounds normoactive.  No guarding or masses noted.   :  External genitalia normal.  Normal vaginal mucosa.  Cervix no lesions or cervical motion tenderness. Uterus normal size, no masses.  Adnexa no masses palpated bilaterally.   Musculoskeletal:  Full ROM of extremities.  Muscle strength equal +5/5.   Neurological:  Cranial nerves 2-12 intact.      Assessment and Plan:     1. Routine general medical examination at a health care facility  Discussed considering a healthy diet and exercising.  Discussed importance of routine sunscreen.  Recommended taking a daily multivitamin.  - Hemoglobin  - Lipid Cascade    2. Impaired fasting glucose  - Glucose    3. Counseling about travel  Provided second hepatitis A vaccine.  She had a positive hepatitis B titer.  She had typhoid vaccine in 2013.  We reviewed malaria and she is traveling in [facility] which is mostly avoidance only.  Patient plans on reviewing this and if she is interested, she will call for malaria prophylaxis.  We also discussed [place] encephalitis and  Cholera vaccines.  Patient would like to research these as well.  She'll follow-up as needed.  - Hepatitis A vaccine adult IM  - ciprofloxacin HCl (CIPRO) 500 MG tablet; Take 1 tablet (500 mg total) by mouth 2 (two) times a day for 5 days. As needed for traveler's diarrhea  Dispense: 10 tablet; Refill: 0

## 2021-06-15 NOTE — PROGRESS NOTES
Assessment and Plan:    1. Routine general medical examination at a health care facility  Discussed consuming a healthy diet and exercising, importance routine sunscreen, recommendation of a daily multivitamin.  Provided influenza vaccine.  - Influenza, Seasonal,Quad Inj, 36+ MOS    2. Left hip pain  Suspect trochanteric bursitis.  Discussed symptomatic treatment including rest, ice, NSAIDs.  Discussed avoidance of kneeling, squatting, stair climbing.  If symptoms persist, will refer to orthopedics for possible cortisone injection.  She is content with the plan.  She will continue her foam rolling and stretching plan    Subjective:     Arianna is a 32 y.o. female presenting to the clinic for a female physical.     LMP: 1/19/18 regular once/month   Hx of abnormal pap smear: none  Last pap smear: 1/13/15 normal, negative HPV   Perform self-breast exams: none  Vaginal discharge or irritation: normal discharge  Sexually active: no, currently single  Contraception: none  Concerns for STDs: no  Previous pregnancies:no    Patient takes Spironolactone for acne.  She is seen dermatology consultants.  She takes lorazepam as needed for anxiety with flying.    Patient is concerned of left hip pain which developed 2 weeks ago.  Patient states that the pain occurs with ambulation.  She states it has improved since she started a stretching and foam rolling program.  She exercises daily by strength training and occasionally performing an HIIT workout.  She performs deadlifts and squats.  Patient states the hip is not bothersome when she exercises.  She has noticed a cracking sensation within her left knee.  States it occurs when she moves from a sitting to standing position.  She denies any pain in the knee.  She has been seeing a chiropractor for intermittent lower lumbar pain since the fall.  She denies back pain during her hip pain.    Review of systems:  I performed a 10 point review of systems.  All pertinent positives and  negatives are noted in the HPI. All others are negative.     No Known Allergies    Current Outpatient Prescriptions on File Prior to Visit   Medication Sig Dispense Refill     LORazepam (ATIVAN) 0.5 MG tablet Take 1 tablet (0.5 mg total) by mouth every 8 (eight) hours as needed for anxiety. 10 tablet 0     MULTIVITAMIN (MULTIPLE VITAMIN ORAL) Take 1 tablet by mouth daily.       No current facility-administered medications on file prior to visit.        Social History     Social History     Marital status: Single     Spouse name: N/A     Number of children: N/A     Years of education: N/A     Occupational History     Not on file.     Social History Main Topics     Smoking status: Never Smoker     Smokeless tobacco: Never Used     Alcohol use Yes      Comment: rare     Drug use: No     Sexual activity: No      Comment: single      Other Topics Concern     Not on file     Social History Narrative       No past medical history on file.    Family History   Problem Relation Age of Onset     Breast cancer Paternal Grandmother 40     Kidney disease Father      Hypertension Father      No Medical Problems Mother      No Medical Problems Sister      No Medical Problems Brother      No Medical Problems Sister      No Medical Problems Brother        Past Surgical History:   Procedure Laterality Date     BREAST BIOPSY Left 2005       Objective:     Vitals:    02/01/18 1310   BP: 112/64   Pulse: 66   Resp: 16   SpO2: 98%       Patient is alert, no obvious distress.   Skin: Warm, dry.  No rashes or lesions. Skin turgor rapid return.   HEENT:  Eyes normal.  Ears normal.  Nose patent, mucosa pink.  Oropharynx mucosa pink, no lesions or tonsil enlargement.   Neck:  Supple, without lymphadenopathy, bruits, JVD. Thyroid normal texture and size.    Lungs:  Clear to auscultation.  No wheezing, rales noted.  Respirations even and unlabored.   Heart:  Regular rate and rhythm.  No murmurs.   Breasts:  Normal.  No surrounding adenopathy.    Abdomen: Soft, nontender.  No organomegaly.  Bowel sounds normoactive.  No guarding or masses noted.   :  deferred  Musculoskeletal: She has full range of motion of her extremities.  She is tender to palpation over left hip trochanter bursa.  She is able to squat without difficulty.  She has full range of motion of her left knee.  There is minimal crepitus palpated upon extension.  Valgus, varus, Lachman's, Saniya's are negative.  Neurological:  Cranial nerves 2-12 intact.

## 2021-06-17 NOTE — PROGRESS NOTES
Assessment and Plan:    1. Routine general medical examination at a health care facility  Recommend consuming a healthy diet and exercising.  She declines hepatitis C and HIV screening.  She is not interested in the Covid vaccine.  - Hemoglobin    2. Lipid screening  - Lipid Cascade    3. Diabetes mellitus screening  - Glycosylated Hemoglobin A1c  - Glucose    4. Hair loss  We will check thyroid labs.  - TSH  - T4, Free    5. Onychomycosis  We will treat with Penlac, use as directed.  Educated on its indications and side effects.  - ciclopirox (PENLAC) 8 % solution; Apply over nail and surrounding skin. Apply daily over previous coat. After seven (7) days, may remove with alcohol and continue cycle.  Dispense: 6.6 mL; Refill: 3    6. Callus of foot  Patient appears to have a callus.  Will refer to podiatry for further evaluation and treatment as this is causing her some discomfort.  - Ambulatory referral to Podiatry - [facility] (includes [facility] groups)    7. Medial epicondylitis of elbow, right  Discussed symptomatic treatment including rest, ice.  She can take over the counter acetaminophen or ibuprofen for pain.  Recommend wearing elbow brace with strength training activities.  If symptoms persist or worsen, she is to follow-up.  She is content with the plan.      Subjective:     Arianna is a 36 y.o. female presenting to the clinic for a female physical.     LMP:5/1/21  Hx of abnormal pap smear: none  Last pap smear: 3/6/20 normal, negative HPV   Perform self-breast exams: none  Vaginal discharge or irritation: normal discharge  Sexually active: no, currently single  Contraception: none  Concerns for STDs: no  Previous pregnancies:no    Patient was taking spironolactone for acne.  She discontinued this on her own.  Patient has been seeing a naturopath who recommended she stop consuming sugar, gluten, and dairy.  Her symptoms have improved.  She did have some labs performed and her thyroid labs were  borderline.  Patient has had some hair loss and is taking supplements.    Patient has dry scaly skin on the bottom of her foot for 1 year.  She has been exfoliating the area.  She continues to experience some pain primarily with activity.    Patient is concerned of possible toenail fungus.  She has some yellowish discoloration of her right foot large toenail and feels as though it is starting in the left foot.  She has been using over-the-counter cream with minimal relief.    Lastly, patient states in the fall she tweaked her right elbow.  She has intermittent pain within the inner aspect of the elbow and occasionally feels as though she loses  within the elbow.    Review of systems:  I performed a 10 point review of systems.  All pertinent positives and negatives are noted in the HPI. All others are negative.     No Known Allergies    Current Outpatient Medications on File Prior to Visit   Medication Sig Dispense Refill     cholecalciferol, vitamin D3, 1,000 unit (25 mcg) tablet Take 1,000 Units by mouth daily.       MULTIVITAMIN (MULTIPLE VITAMIN ORAL) Take 1 tablet by mouth daily.       omega-3/dha/epa/fish oil (FISH OIL-OMEGA-3 FATTY ACIDS) 300-1,000 mg capsule        magnesium oxide (MAG-OX) 400 mg (241.3 mg magnesium) tablet TAKE 1 TABLET BY MOUTH EVERY DAY 30 tablet 2     spironolactone (ALDACTONE) 50 MG tablet Take 50 mg by mouth 3 (three) times a day as needed.       No current facility-administered medications on file prior to visit.        Social History     Socioeconomic History     Marital status: Single     Spouse name: Not on file     Number of children: Not on file     Years of education: Not on file     Highest education level: Not on file   Occupational History     Not on file   Social Needs     Financial resource strain: Not on file     Food insecurity     Worry: Not on file     Inability: Not on file     Transportation needs     Medical: Not on file     Non-medical: Not on file   Tobacco Use      Smoking status: Never Smoker     Smokeless tobacco: Never Used   Substance and Sexual Activity     Alcohol use: Yes     Comment: rare     Drug use: No     Sexual activity: Never     Comment: single    Lifestyle     Physical activity     Days per week: Not on file     Minutes per session: Not on file     Stress: Not on file   Relationships     Social connections     Talks on phone: Not on file     Gets together: Not on file     Attends [employer] service: Not on file     Active member of club or organization: Not on file     Attends meetings of clubs or organizations: Not on file     Relationship status: Not on file     Intimate partner violence     Fear of current or ex partner: Not on file     Emotionally abused: Not on file     Physically abused: Not on file     Forced sexual activity: Not on file   Other Topics Concern     Not on file   Social History Narrative     Not on file       No past medical history on file.    Family History   Problem Relation Age of Onset     Breast cancer Paternal Grandmother 40     Kidney disease Father      Hypertension Father      No Medical Problems Mother      No Medical Problems Sister      No Medical Problems Brother      No Medical Problems Sister      No Medical Problems Brother        Past Surgical History:   Procedure Laterality Date     BREAST BIOPSY Left 2005     HIP SURGERY Left        Objective:     Vitals:    05/07/21 0701   BP: 100/62   Pulse: 60       Patient is alert, no obvious distress.   Skin: Warm, dry.  She has a dime sized callus present just below her fifth MTP.  Her large toenails are a yellow in color.  HEENT:  Eyes normal.  Ears normal.  Nose patent, mucosa pink.  Oropharynx mucosa pink, no lesions or tonsil enlargement.   Neck:  Supple, without lymphadenopathy, bruits, JVD. Thyroid normal texture and size.    Lungs:  Clear to auscultation.  No wheezing, rales noted.  Respirations even and unlabored.   Heart:  Regular rate and rhythm.  No murmurs.    Breasts:  Normal.  No surrounding adenopathy.   Abdomen: Soft, nontender.  No organomegaly.  Bowel sounds normoactive.  No guarding or masses noted.   :  deferred  Musculoskeletal:  Full ROM of extremities.  She is tender to palpation of her medial epicondyle within the right elbow.  Neurological: No obvious gross or fine motor deficit is present.

## 2021-06-22 NOTE — PROGRESS NOTES
Assessment and Plan:     1. Vaginal discharge  Due to vaginal itching, will treat for possible yeast vaginitis with Diflucan 150 mg once. Educated on its indications and side effects.   - Wet Prep, Vaginal  - fluconazole (DIFLUCAN) 150 MG tablet; Take 1 tablet (150 mg total) by mouth once for 1 dose.  Dispense: 1 tablet; Refill: 1    2. Bad odor of urine  Urinalysis shows moderate blood. Will check urine culture.  She is to increase her fluid intake.   - Urinalysis-UC if Indicated    3. Microscopic hematuria  Urine micro confirms RBC's.  Will wait for urine culture results.  If negative, may repeat urinalysis or consider CT for further evaluation.  Blood may be from recent menstrual period.  She is content with the plan.   - Culture, Urine      Subjective:     Arianna is a 33 y.o. female presenting to the clinic for concerns for vaginal itching for one month.  She describes an increase in white, thick vaginal discharge.  She has noticed an odor to her urine.  She complains of urinary frequency, but denies urinary urgency, low back pain, fever, dysuria, hematuria.  She has not noticed any vaginal sores.  The vaginal itching is both internal and external.  Her LMP was one week ago.  She is single and has no concerns for STDs.  She has not tried any OTC products for her symptoms.     Review of Systems: A complete 14 point review of systems was obtained and is negative or as stated in the history of present illness.    Social History     Socioeconomic History     Marital status: Single     Spouse name: Not on file     Number of children: Not on file     Years of education: Not on file     Highest education level: Not on file   Social Needs     Financial resource strain: Not on file     Food insecurity - worry: Not on file     Food insecurity - inability: Not on file     Transportation needs - medical: Not on file     Transportation needs - non-medical: Not on file   Occupational History     Not on file   Tobacco Use      "Smoking status: Never Smoker     Smokeless tobacco: Never Used   Substance and Sexual Activity     Alcohol use: Yes     Comment: rare     Drug use: No     Sexual activity: No     Comment: single    Other Topics Concern     Not on file   Social History Narrative     Not on file       Active Ambulatory Problems     Diagnosis Date Noted     Fibroadenoma Of The Breast      Impaired Fasting Glucose      Neck Pain      Lower Back Pain      Resolved Ambulatory Problems     Diagnosis Date Noted     Upper Respiratory Infection      No Additional Past Medical History       Family History   Problem Relation Age of Onset     Breast cancer Paternal Grandmother 40     Kidney disease Father      Hypertension Father      No Medical Problems Mother      No Medical Problems Sister      No Medical Problems Brother      No Medical Problems Sister      No Medical Problems Brother        Objective:     /64   Pulse 70   Ht 5' 4\" (1.626 m)   Wt 123 lb 1.6 oz (55.8 kg)   SpO2 99%   BMI 21.13 kg/m      Patient is alert, in no obvious distress.   Skin: Warm, dry.    Lungs:  Clear to auscultation. Respirations even and unlabored.  No wheezing or rales noted.   Heart:  Regular rate and rhythm.  No murmurs.   Abdomen: Soft, nontender.  No organomegaly. Bowel sounds normoactive. No guarding or masses noted.  CVA tenderness is negative bilaterally.   : Patient performed her own wet prep.      LABORATORY: wet prep and urinalysis ordered. Wet prep is negative.  Urinalysis shows moderate blood.             "

## 2021-06-24 NOTE — PROGRESS NOTES
Assessment and Plan:    1. Routine general medical examination at a health care facility  Discussed consuming a healthy diet and exercising.  She is up-to-date on vaccinations.  - Hemoglobin    2. Lipid screening  - Lipid Cascade    3. Diabetes mellitus screening  - Glucose    4. Hip pain, left  Suspect trochanteric bursitis.  Patient also has symptoms of IT band syndrome.  Discussed symptomatic treatment including rest, ice, NSAIDs.  Will refer to orthopedics for possible cortisone injection.  She is to avoid kneeling, squatting, stair climbing.  - Ambulatory referral to Orthopedics    5. Acne vulgaris  She continues Spironolactone which is prescribed by her dermatologist.  She is content with the plan.      Subjective:     Arianna is a 33 y.o. female presenting to the clinic for a female physical.     LMP: 2/16/19   Hx of abnormal pap smear: none  Last pap smear: 1/13/15 normal, negative HPV   Perform self-breast exams: none  Vaginal discharge or irritation: normal discharge  Sexually active: no, currently single  Contraception: none  Concerns for STDs: no  Previous pregnancies:no    Patient has been experiencing left hip pain over the past year.  She denies any specific injury, but states it developed after strength training.  She has difficulty performing squats and lunges.  She complains of an intermittent soreness within the left hip.  Occasionally, the pain will radiate down the lateral aspect of her thigh to her knee.  She has been seeing a chiropractor with no relief.    Patient takes Spironolactone for acne which is prescribed by her dermatologist.    Review of systems:  I performed a 10 point review of systems.  All pertinent positives and negatives are noted in the HPI. All others are negative.     No Known Allergies    Current Outpatient Medications on File Prior to Visit   Medication Sig Dispense Refill     MULTIVITAMIN (MULTIPLE VITAMIN ORAL) Take 1 tablet by mouth daily.       omega-3/dha/epa/fish  oil (FISH OIL-OMEGA-3 FATTY ACIDS) 300-1,000 mg capsule        spironolactone (ALDACTONE) 50 MG tablet Take 50 mg by mouth 3 (three) times a day as needed.       No current facility-administered medications on file prior to visit.        Social History     Socioeconomic History     Marital status: Single     Spouse name: Not on file     Number of children: Not on file     Years of education: Not on file     Highest education level: Not on file   Occupational History     Not on file   Social Needs     Financial resource strain: Not on file     Food insecurity:     Worry: Not on file     Inability: Not on file     Transportation needs:     Medical: Not on file     Non-medical: Not on file   Tobacco Use     Smoking status: Never Smoker     Smokeless tobacco: Never Used   Substance and Sexual Activity     Alcohol use: Yes     Comment: rare     Drug use: No     Sexual activity: No     Comment: single    Lifestyle     Physical activity:     Days per week: Not on file     Minutes per session: Not on file     Stress: Not on file   Relationships     Social connections:     Talks on phone: Not on file     Gets together: Not on file     Attends [employer] service: Not on file     Active member of club or organization: Not on file     Attends meetings of clubs or organizations: Not on file     Relationship status: Not on file     Intimate partner violence:     Fear of current or ex partner: Not on file     Emotionally abused: Not on file     Physically abused: Not on file     Forced sexual activity: Not on file   Other Topics Concern     Not on file   Social History Narrative     Not on file       No past medical history on file.    Family History   Problem Relation Age of Onset     Breast cancer Paternal Grandmother 40     Kidney disease Father      Hypertension Father      No Medical Problems Mother      No Medical Problems Sister      No Medical Problems Brother      No Medical Problems Sister      No Medical Problems Brother         Past Surgical History:   Procedure Laterality Date     BREAST BIOPSY Left 2005       Objective:     Vitals:    03/05/19 0706   BP: (!) 88/58   Pulse: 65   SpO2: 98%       Patient is alert, no obvious distress.   Skin: Warm, dry.  No rashes or lesions. Skin turgor rapid return.   HEENT:  Eyes normal.  Ears normal.  Nose patent, mucosa pink.  Oropharynx mucosa pink, no lesions or tonsil enlargement.   Neck:  Supple, without lymphadenopathy, bruits, JVD. Thyroid normal texture and size.    Lungs:  Clear to auscultation.  No wheezing, rales noted.  Respirations even and unlabored.   Heart:  Regular rate and rhythm.  No murmurs.   Breasts:  Normal.  No surrounding adenopathy.   Abdomen: Soft, nontender.  No organomegaly.  Bowel sounds normoactive.  No guarding or masses noted.   :    Musculoskeletal:  Full ROM of extremities.  Muscle strength equal +5/5. She has full ROM of her left hip.  She is able to squat and lunge without difficulty. She is tender to palpation of her left trochanteric bursa.   Neurological:  Cranial nerves 2-12 intact.

## 2021-07-03 NOTE — ADDENDUM NOTE
Addendum Note by Eleni Quijano CNP at 3/6/2020  9:30 AM     Author: Eleni Quijano CNP Service: -- Author Type: Nurse Practitioner    Filed: 3/6/2020 11:53 AM Encounter Date: 3/6/2020 Status: Signed    : Eleni Quijano CNP (Nurse Practitioner)    Addended by: ELENI QUIJANO on: 3/6/2020 11:53 AM        Modules accepted: Orders

## 2021-10-10 ENCOUNTER — HEALTH MAINTENANCE LETTER (OUTPATIENT)
Age: 36
End: 2021-10-10

## 2022-07-16 ENCOUNTER — HEALTH MAINTENANCE LETTER (OUTPATIENT)
Age: 37
End: 2022-07-16

## 2022-09-18 ENCOUNTER — HEALTH MAINTENANCE LETTER (OUTPATIENT)
Age: 37
End: 2022-09-18

## 2023-01-13 ENCOUNTER — TRANSFERRED RECORDS (OUTPATIENT)
Dept: HEALTH INFORMATION MANAGEMENT | Facility: CLINIC | Age: 38
End: 2023-01-13

## 2023-07-30 ENCOUNTER — HEALTH MAINTENANCE LETTER (OUTPATIENT)
Age: 38
End: 2023-07-30

## 2024-05-05 ENCOUNTER — HEALTH MAINTENANCE LETTER (OUTPATIENT)
Age: 39
End: 2024-05-05